# Patient Record
Sex: FEMALE | URBAN - METROPOLITAN AREA
[De-identification: names, ages, dates, MRNs, and addresses within clinical notes are randomized per-mention and may not be internally consistent; named-entity substitution may affect disease eponyms.]

---

## 2022-09-06 ENCOUNTER — APPOINTMENT (RX ONLY)
Dept: URBAN - METROPOLITAN AREA CLINIC 111 | Facility: CLINIC | Age: 47
Setting detail: DERMATOLOGY
End: 2022-09-06

## 2022-09-06 DIAGNOSIS — Z71.89 OTHER SPECIFIED COUNSELING: ICD-10-CM

## 2022-09-06 DIAGNOSIS — L72.0 EPIDERMAL CYST: ICD-10-CM

## 2022-09-06 PROBLEM — D23.62 OTHER BENIGN NEOPLASM OF SKIN OF LEFT UPPER LIMB, INCLUDING SHOULDER: Status: ACTIVE | Noted: 2022-09-06

## 2022-09-06 PROBLEM — D48.5 NEOPLASM OF UNCERTAIN BEHAVIOR OF SKIN: Status: ACTIVE | Noted: 2022-09-06

## 2022-09-06 PROCEDURE — ? SURGICAL DECISION MAKING

## 2022-09-06 PROCEDURE — ? BIOPSY BY SHAVE METHOD

## 2022-09-06 PROCEDURE — 99203 OFFICE O/P NEW LOW 30 MIN: CPT | Mod: 25

## 2022-09-06 PROCEDURE — ? DIAGNOSIS COMMENT

## 2022-09-06 PROCEDURE — ? COUNSELING

## 2022-09-06 PROCEDURE — 11102 TANGNTL BX SKIN SINGLE LES: CPT

## 2022-09-06 PROCEDURE — ? OBSERVATION AND MEASURE

## 2022-09-06 ASSESSMENT — LOCATION ZONE DERM: LOCATION ZONE: TRUNK

## 2022-09-06 ASSESSMENT — LOCATION DETAILED DESCRIPTION DERM: LOCATION DETAILED: LEFT RIB CAGE

## 2022-09-06 ASSESSMENT — LOCATION SIMPLE DESCRIPTION DERM: LOCATION SIMPLE: ABDOMEN

## 2022-09-06 NOTE — PROCEDURE: BIOPSY BY SHAVE METHOD
Detail Level: Detailed
Depth Of Biopsy: dermis
Was A Bandage Applied: Yes
Size Of Lesion In Cm: 1.3
X Size Of Lesion In Cm: 0
Biopsy Type: H and E
Biopsy Method: Dermablade
Anesthesia Type: 1% lidocaine with epinephrine
Anesthesia Volume In Cc (Will Not Render If 0): 0.5
Hemostasis: Drysol
Wound Care: Polysporin ointment
Dressing: bandage
Destruction After The Procedure: No
Type Of Destruction Used: Curettage
Curettage Text: The wound bed was treated with curettage after the biopsy was performed.
Cryotherapy Text: The wound bed was treated with cryotherapy after the biopsy was performed.
Electrodesiccation Text: The wound bed was treated with electrodesiccation after the biopsy was performed.
Electrodesiccation And Curettage Text: The wound bed was treated with electrodesiccation and curettage after the biopsy was performed.
Silver Nitrate Text: The wound bed was treated with silver nitrate after the biopsy was performed.
Lab: [id number]
Lab Facility: [id number]
Consent: Verbal consent was obtained and risks were reviewed including but not limited to scarring, infection, bleeding, scabbing, incomplete removal, nerve damage and allergy to anesthesia.
Post-Care Instructions: CARE INSTRUCTIONS: SHAVE BIOPSY \\nThis information is also available on our website: [email or web address]\\nWound Site Care\\n    • Keep wound dry today and remove bandage in 24 hours. \\n    • Shower normally, allowing soap and water to run across the wound, but do not scrub. Antibacterial soaps, such as Dial, may be used daily. \\n    • Keep wound moist with application of Polysporin ointment or Vaseline 1-2 times daily. (If you have itching or irritation from topical antibiotics like Polysporin, then switch to plain Vaseline.) Keeping the wound moist reduces infection, minimizes scarring, and prevents crust formation over the wound. \\n    • Cover with clean bandage daily. If you have steri-strips (small white bandages) across the wound, leave intact. These typically fall off within a few days. \\n    • Avoid picking at the wound site, which increases risk of infection and scarring. \\n    • If wound is near the eyes, cold compresses may be used for 20 minutes each hour to minimize pain, swelling, and bruising. Puffiness under the eyes is possible for a few days after the procedure. \\n    • For any wound discomfort, you may take Tylenol (or acetaminophen). Adults may take 500-1000 mg every six hours (if you are able to take Tylenol). Or use cold compresses for up to 20 minutes hourly as needed. \\n    • Allow several weeks for wound to fully heal. Temporary discoloration at the wound site is normal and may take several months to fade.\\nIf You Have Bleeding\\n    • Hold firm pressure for 20 minutes, without lifting the pressure to look at wound. Repeat as needed. \\n    • If bleeding does not stop, apply ice compresses and call our office (or report to the nearest ER.)\\nActivities to Avoid\\nIf your wound is large or has sutures, then until sutures are removed you should:\\n    • Avoid contaminated water (lake, river, or ocean); use waterproof bandage in chlorinated pool. \\n    • If sutures are present, minimize activity that produces stress to excision site (heavy lifting or exercise that pulls at the wound, such as lunges for thigh wounds or golf for upper back wounds)\\nWatch for Infection\\n    • Slight redness, initial tenderness, and clear yellow discharge are normal. \\n    • Call the office if you have signs of infection, such as increased tenderness, warmth, spreading redness, swelling, or thick white-to-yellow discharge. \\n    • Seek urgent medical attention for severe infection, with fever, chills, nausea, or pain radiating from the wound to surrounding tissue.\\nNext Steps\\n    • If you have sutures, return for suture removal in 5-14 days, as directed by physician, but call sooner if you are concerned about your wound. \\n    • Pathology results will be given by telephone in 7-10 days. Sometimes results may take longer depending on if special stains or testing is needed. If you have not received results after 14 days it is important that you call our office and let us know. \\n    • Return to clinic if you notice any new or changing moles, or if physician recommended regular skin exams.\\nLaboratory Billing Information\\n    • Your specimen has been sent for laboratory testing. The lab our office typically uses is [facility], if you have any questions about your bill.\\n    • If you have insurance coverage, we have forwarded your insurance details to the lab. If a balance is owed once the claim has been processed, you will receive an invoice from the laboratory directly
Notification Instructions: Patient will be notified of biopsy results. However, patient instructed to call the office if not contacted within 2 weeks.
Billing Type: Third-Party Bill
Information: Selecting Yes will display possible errors in your note based on the variables you have selected. This validation is only offered as a suggestion for you. PLEASE NOTE THAT THE VALIDATION TEXT WILL BE REMOVED WHEN YOU FINALIZE YOUR NOTE. IF YOU WANT TO FAX A PRELIMINARY NOTE YOU WILL NEED TO TOGGLE THIS TO 'NO' IF YOU DO NOT WANT IT IN YOUR FAXED NOTE.

## 2022-09-06 NOTE — PROCEDURE: OBSERVATION
Body Location Override (Optional - Billing Will Still Be Based On Selected Body Map Location If Applicable): left upper abdomen
Detail Level: Detailed
Size Of Lesion: 1.5 cm
Size Of Lesion: 1.7 cm

## 2022-09-06 NOTE — PROCEDURE: SURGICAL DECISION MAKING
Discussion: Discussed excision since cyst is bothersome.  Patient has noted growth.
Initial Decision For Surgery?: No
Identified Risk Factors Documented?: yes
Risk Assessment Explanation (Does Not Render In The Note): Clinical determination of the probability and/or consequences of an event, such as surgery. Clinical assessment of the level of risk is affected by the nature of the event under consideration for the patient. Modifier 57 is used to indicate an Evaluation and Management (E/M) service resulted in the initial decision to perform surgery either the day before a major surgery (90 day global) or the day of a major surgery.
Complexity (Necessary For Coding; Major - 90 Day Global With Some Exceptions; Minor - 10 Day Global): minor

## 2022-09-06 NOTE — PROCEDURE: DIAGNOSIS COMMENT
Render Risk Assessment In Note?: no
Comment: Discussed leaving lesion alone, if it becomes bothersome can do a punch excision.
Detail Level: Simple
Comment: Advised patient to schedule a FBSE at least once a year.

## 2022-10-13 ENCOUNTER — APPOINTMENT (RX ONLY)
Dept: URBAN - METROPOLITAN AREA CLINIC 111 | Facility: CLINIC | Age: 47
Setting detail: DERMATOLOGY
End: 2022-10-13

## 2022-10-13 PROBLEM — C44.612 BASAL CELL CARCINOMA OF SKIN OF RIGHT UPPER LIMB, INCLUDING SHOULDER: Status: ACTIVE | Noted: 2022-10-13

## 2022-10-13 PROCEDURE — 17262 DSTRJ MAL LES T/A/L 1.1-2.0: CPT

## 2022-10-13 PROCEDURE — ? CURETTAGE AND DESTRUCTION

## 2023-04-09 NOTE — PROCEDURE: CURETTAGE AND DESTRUCTION
Detail Level: Detailed
Number Of Curettages: 3
Size Of Lesion In Cm: 1.3
Size Of Lesion After Curettage: 1.6
Add Intralesional Injection: No
Total Volume (Ccs): 1
Anesthesia Type: 1% lidocaine with epinephrine and a 1:10 solution of 8.4% sodium bicarbonate
Cautery Type: electrodesiccation
What Was Performed First?: Curettage
Final Size Statement: The size of the lesion after curettage was
Additional Information: (Optional): The wound was cleaned, and a pressure dressing was applied.  The patient received detailed post-op instructions.
Consent was obtained from the patient. The risks, benefits and alternatives to therapy were discussed in detail. Specifically, the risks of infection, scarring, bleeding, prolonged wound healing, nerve injury, incomplete removal, allergy to anesthesia and recurrence were addressed. Alternatives to ED&C, such as: surgical removal and XRT were also discussed.  Prior to the procedure, the treatment site was clearly identified and confirmed by the patient. All components of Universal Protocol/PAUSE Rule completed.
Post-Care Instructions: I reviewed with the patient in detail post-care instructions. Patient is to keep the area dry for 48 hours, and not to engage in any swimming until the area is healed. Should the patient develop any fevers, chills, bleeding, severe pain patient will contact the office immediately.
Bill As A Line Item Or As Units: Line Item
no

## 2023-05-01 ENCOUNTER — APPOINTMENT (RX ONLY)
Dept: URBAN - METROPOLITAN AREA CLINIC 111 | Facility: CLINIC | Age: 48
Setting detail: DERMATOLOGY
End: 2023-05-01

## 2023-05-01 DIAGNOSIS — L72.0 EPIDERMAL CYST: ICD-10-CM

## 2023-05-01 DIAGNOSIS — Z71.89 OTHER SPECIFIED COUNSELING: ICD-10-CM

## 2023-05-01 DIAGNOSIS — L82.1 OTHER SEBORRHEIC KERATOSIS: ICD-10-CM

## 2023-05-01 DIAGNOSIS — L81.4 OTHER MELANIN HYPERPIGMENTATION: ICD-10-CM

## 2023-05-01 DIAGNOSIS — L57.8 OTHER SKIN CHANGES DUE TO CHRONIC EXPOSURE TO NONIONIZING RADIATION: ICD-10-CM

## 2023-05-01 DIAGNOSIS — D22 MELANOCYTIC NEVI: ICD-10-CM

## 2023-05-01 DIAGNOSIS — Z85.828 PERSONAL HISTORY OF OTHER MALIGNANT NEOPLASM OF SKIN: ICD-10-CM

## 2023-05-01 DIAGNOSIS — D49.2 NEOPLASM OF UNSPECIFIED BEHAVIOR OF BONE, SOFT TISSUE, AND SKIN: ICD-10-CM

## 2023-05-01 PROBLEM — D23.62 OTHER BENIGN NEOPLASM OF SKIN OF LEFT UPPER LIMB, INCLUDING SHOULDER: Status: ACTIVE | Noted: 2023-05-01

## 2023-05-01 PROBLEM — D23.71 OTHER BENIGN NEOPLASM OF SKIN OF RIGHT LOWER LIMB, INCLUDING HIP: Status: ACTIVE | Noted: 2023-05-01

## 2023-05-01 PROBLEM — D22.5 MELANOCYTIC NEVI OF TRUNK: Status: ACTIVE | Noted: 2023-05-01

## 2023-05-01 PROCEDURE — 99213 OFFICE O/P EST LOW 20 MIN: CPT | Mod: 25

## 2023-05-01 PROCEDURE — ? SUNSCREEN RECOMMENDATIONS

## 2023-05-01 PROCEDURE — ? OBSERVATION AND MEASURE

## 2023-05-01 PROCEDURE — ? COUNSELING

## 2023-05-01 PROCEDURE — ? BIOPSY BY SHAVE METHOD

## 2023-05-01 PROCEDURE — 11102 TANGNTL BX SKIN SINGLE LES: CPT

## 2023-05-01 PROCEDURE — ? DIAGNOSIS COMMENT

## 2023-05-01 PROCEDURE — ? SURGICAL DECISION MAKING

## 2023-05-01 ASSESSMENT — LOCATION DETAILED DESCRIPTION DERM
LOCATION DETAILED: LEFT RIB CAGE
LOCATION DETAILED: RIGHT ANTERIOR SHOULDER
LOCATION DETAILED: RIGHT INFERIOR MEDIAL UPPER BACK
LOCATION DETAILED: RIGHT MEDIAL BREAST 4-5:00 REGION
LOCATION DETAILED: INFERIOR THORACIC SPINE
LOCATION DETAILED: SUPERIOR THORACIC SPINE

## 2023-05-01 ASSESSMENT — LOCATION ZONE DERM
LOCATION ZONE: TRUNK
LOCATION ZONE: ARM

## 2023-05-01 ASSESSMENT — LOCATION SIMPLE DESCRIPTION DERM
LOCATION SIMPLE: RIGHT UPPER BACK
LOCATION SIMPLE: RIGHT SHOULDER
LOCATION SIMPLE: ABDOMEN
LOCATION SIMPLE: RIGHT BREAST
LOCATION SIMPLE: UPPER BACK

## 2023-05-01 NOTE — HPI: EVALUATION OF SKIN LESION(S)
What Type Of Note Output Would You Prefer (Optional)?: Standard Output
How Severe Are Your Spot(S)?: mild
Have Your Spot(S) Been Treated In The Past?: has not been treated
Hpi Title: Evaluation of Skin Lesions
Additional History: Last FBSE was 09/22

## 2023-05-01 NOTE — PROCEDURE: BIOPSY BY SHAVE METHOD
Detail Level: Detailed
Depth Of Biopsy: dermis
Was A Bandage Applied: Yes
Size Of Lesion In Cm: 0.5
X Size Of Lesion In Cm: 0
Biopsy Type: H and E
Biopsy Method: Dermablade
Anesthesia Type: 1% lidocaine with epinephrine and a 1:10 solution of 8.4% sodium bicarbonate
Hemostasis: Drysol
Wound Care: Polysporin ointment
Dressing: bandage
Destruction After The Procedure: No
Type Of Destruction Used: Curettage
Curettage Text: The wound bed was treated with curettage after the biopsy was performed.
Cryotherapy Text: The wound bed was treated with cryotherapy after the biopsy was performed.
Electrodesiccation Text: The wound bed was treated with electrodesiccation after the biopsy was performed.
Electrodesiccation And Curettage Text: The wound bed was treated with electrodesiccation and curettage after the biopsy was performed.
Silver Nitrate Text: The wound bed was treated with silver nitrate after the biopsy was performed.
Lab: [id number]
Lab Facility: [id number]
Consent: Verbal consent was obtained and risks were reviewed including but not limited to scarring, infection, bleeding, scabbing, incomplete removal, nerve damage and allergy to anesthesia.
Post-Care Instructions: CARE INSTRUCTIONS: SHAVE BIOPSY \\nThis information is also available on our website: [email or web address]\\nWound Site Care\\n    ? Keep wound dry today and remove bandage in 24 hours. \\n    ? Shower normally, allowing soap and water to run across the wound, but do not scrub. Antibacterial soaps, such as Dial, may be used daily. \\n    ? Keep wound moist with application of Polysporin ointment or Vaseline 1-2 times daily. (If you have itching or irritation from topical antibiotics like Polysporin, then switch to plain Vaseline.) Keeping the wound moist reduces infection, minimizes scarring, and prevents crust formation over the wound. \\n    ? Cover with clean bandage daily. If you have steri-strips (small white bandages) across the wound, leave intact. These typically fall off within a few days. \\n    ? Avoid picking at the wound site, which increases risk of infection and scarring. \\n    ? If wound is near the eyes, cold compresses may be used for 20 minutes each hour to minimize pain, swelling, and bruising. Puffiness under the eyes is possible for a few days after the procedure. \\n    ? For any wound discomfort, you may take Tylenol (or acetaminophen). Adults may take 500-1000 mg every six hours (if you are able to take Tylenol). Or use cold compresses for up to 20 minutes hourly as needed. \\n    ? Allow several weeks for wound to fully heal. Temporary discoloration at the wound site is normal and may take several months to fade.\\nIf You Have Bleeding\\n    ? Hold firm pressure for 20 minutes, without lifting the pressure to look at wound. Repeat as needed. \\n    ? If bleeding does not stop, apply ice compresses and call our office (or report to the nearest ER.)\\nActivities to Avoid\\nIf your wound is large or has sutures, then until sutures are removed you should:\\n    ? Avoid contaminated water (lake, river, or ocean); use waterproof bandage in chlorinated pool. \\n    ? If sutures are present, minimize activity that produces stress to excision site (heavy lifting or exercise that pulls at the wound, such as lunges for thigh wounds or golf for upper back wounds)\\nWatch for Infection\\n    ? Slight redness, initial tenderness, and clear yellow discharge are normal. \\n    ? Call the office if you have signs of infection, such as increased tenderness, warmth, spreading redness, swelling, or thick white-to-yellow discharge. \\n    ? Seek urgent medical attention for severe infection, with fever, chills, nausea, or pain radiating from the wound to surrounding tissue.\\nNext Steps\\n    ? If you have sutures, return for suture removal in 5-14 days, as directed by physician, but call sooner if you are concerned about your wound. \\n    ? Pathology results will be given by telephone in 7-10 days. Sometimes results may take longer depending on if special stains or testing is needed. If you have not received results after 14 days it is important that you call our office and let us know. \\n    ? Return to clinic if you notice any new or changing moles, or if physician recommended regular skin exams.\\nLaboratory Billing Information\\n    ? Your specimen has been sent for laboratory testing. The lab our office typically uses is [facility], if you have any questions about your bill.\\n    ? If you have insurance coverage, we have forwarded your insurance details to the lab. If a balance is owed once the claim has been processed, you will receive an invoice from the laboratory directly
Notification Instructions: Patient will be notified of biopsy results. However, patient instructed to call the office if not contacted within 2 weeks.
Billing Type: Third-Party Bill
Information: Selecting Yes will display possible errors in your note based on the variables you have selected. This validation is only offered as a suggestion for you. PLEASE NOTE THAT THE VALIDATION TEXT WILL BE REMOVED WHEN YOU FINALIZE YOUR NOTE. IF YOU WANT TO FAX A PRELIMINARY NOTE YOU WILL NEED TO TOGGLE THIS TO 'NO' IF YOU DO NOT WANT IT IN YOUR FAXED NOTE.

## 2023-05-01 NOTE — PROCEDURE: DIAGNOSIS COMMENT
Render Risk Assessment In Note?: no
Comment: Discussed leaving lesion alone, if it becomes bothersome can do a punch excision.
Detail Level: Simple

## 2024-05-29 ENCOUNTER — APPOINTMENT (RX ONLY)
Dept: URBAN - METROPOLITAN AREA CLINIC 111 | Facility: CLINIC | Age: 49
Setting detail: DERMATOLOGY
End: 2024-05-29

## 2024-05-29 DIAGNOSIS — L71.8 OTHER ROSACEA: ICD-10-CM

## 2024-05-29 DIAGNOSIS — L72.0 EPIDERMAL CYST: ICD-10-CM

## 2024-05-29 PROCEDURE — ? DIAGNOSIS COMMENT

## 2024-05-29 PROCEDURE — ? PRESCRIPTION

## 2024-05-29 PROCEDURE — ? PRESCRIPTION MEDICATION MANAGEMENT

## 2024-05-29 PROCEDURE — ? COUNSELING

## 2024-05-29 PROCEDURE — 99214 OFFICE O/P EST MOD 30 MIN: CPT

## 2024-05-29 PROCEDURE — ? OBSERVATION AND MEASURE

## 2024-05-29 RX ORDER — AZELAIC ACID 0.15 G/G
GEL TOPICAL
Qty: 50 | Refills: 2 | Status: ERX | COMMUNITY
Start: 2024-05-29

## 2024-05-29 RX ORDER — OXYMETAZOLINE HYDROCHLORIDE 1 G/100G
CREAM TOPICAL
Qty: 30 | Refills: 0 | Status: ERX | COMMUNITY
Start: 2024-05-29

## 2024-05-29 RX ADMIN — OXYMETAZOLINE HYDROCHLORIDE: 1 CREAM TOPICAL at 00:00

## 2024-05-29 RX ADMIN — AZELAIC ACID: 0.15 GEL TOPICAL at 00:00

## 2024-05-29 ASSESSMENT — LOCATION SIMPLE DESCRIPTION DERM
LOCATION SIMPLE: ABDOMEN
LOCATION SIMPLE: RIGHT CHEEK
LOCATION SIMPLE: LEFT CHEEK

## 2024-05-29 ASSESSMENT — LOCATION DETAILED DESCRIPTION DERM
LOCATION DETAILED: RIGHT INFERIOR CENTRAL MALAR CHEEK
LOCATION DETAILED: LEFT RIB CAGE
LOCATION DETAILED: LEFT INFERIOR CENTRAL MALAR CHEEK

## 2024-05-29 ASSESSMENT — LOCATION ZONE DERM
LOCATION ZONE: FACE
LOCATION ZONE: TRUNK

## 2024-05-29 NOTE — PROCEDURE: PRESCRIPTION MEDICATION MANAGEMENT
Plan: Recommended BBL laser as cosmetic option
Render In Strict Bullet Format?: Yes
Detail Level: Zone
Initiate Treatment: -\\n- rhofade: apply to face once daily in the morning \\n- OTC vitamin C serum: apply to face after Rhofade (recommended [facility] [place]) \\n- sunscreen: apply to face daily (recommended Elta Clear)\\n- finacea: apply to face at night

## 2024-05-29 NOTE — PROCEDURE: DIAGNOSIS COMMENT
Render Risk Assessment In Note?: no
Detail Level: Simple
Comment: Patient defers excision at this time